# Patient Record
Sex: MALE | Race: BLACK OR AFRICAN AMERICAN | Employment: OTHER | URBAN - METROPOLITAN AREA
[De-identification: names, ages, dates, MRNs, and addresses within clinical notes are randomized per-mention and may not be internally consistent; named-entity substitution may affect disease eponyms.]

---

## 2017-03-20 ENCOUNTER — PATIENT OUTREACH (OUTPATIENT)
Dept: INTERNAL MEDICINE CLINIC | Age: 82
End: 2017-03-20

## 2017-03-20 NOTE — PROGRESS NOTES
Pt identified by [facility] high risk report. Pt last visit with Dr Julia Davis 9/27/16. Pt is due for HTN/low magnesium follow up appt. Pt was seen at [facility]. Per EMR due to: fall/swollen area to the left forehead.      NN outgoing call to pt. Not able to reach. Left message on voice mail for return call.

## 2017-03-24 ENCOUNTER — OFFICE VISIT (OUTPATIENT)
Dept: INTERNAL MEDICINE CLINIC | Age: 82
End: 2017-03-24

## 2017-03-24 ENCOUNTER — HOSPITAL ENCOUNTER (OUTPATIENT)
Dept: LAB | Age: 82
Discharge: HOME | End: 2017-03-24

## 2017-03-24 VITALS
HEIGHT: 63 IN | WEIGHT: 91.6 LBS | HEART RATE: 123 BPM | SYSTOLIC BLOOD PRESSURE: 105 MMHG | BODY MASS INDEX: 16.23 KG/M2 | TEMPERATURE: 98.7 F | OXYGEN SATURATION: 97 % | DIASTOLIC BLOOD PRESSURE: 74 MMHG

## 2017-03-24 DIAGNOSIS — R31.9 URINARY TRACT INFECTION WITH HEMATURIA, SITE UNSPECIFIED: ICD-10-CM

## 2017-03-24 DIAGNOSIS — N39.0 URINARY TRACT INFECTION WITH HEMATURIA, SITE UNSPECIFIED: ICD-10-CM

## 2017-03-24 DIAGNOSIS — R00.0 TACHYCARDIA: Primary | ICD-10-CM

## 2017-03-24 PROCEDURE — 99001 SPECIMEN HANDLING PT-LAB: CPT | Performed by: INTERNAL MEDICINE

## 2017-03-24 NOTE — PROGRESS NOTES
Patient presents today for F/U hypertension   Pt preferred language for healthcare discussion is english. Do you have an advanced directive? No  Is someone accompanying this pt? If so who? Yes his daughter   Is the patient using any DME equipment during OV? Yes  What equipment? A wheel chair  1. Have you been to the ER, urgent care clinic since your last visit? Hospitalized since your last visit?  Yes SPAJESSE 6 weeks ago, patient was diagnosed with a UTI

## 2017-03-24 NOTE — PROGRESS NOTES
HISTORY OF PRESENT ILLNESS  Rolando De Leon is a 80 y.o. male. HPI Comments: 79 yo male here for f/u of confusion related to UTI dx in ED last month. Pt presented to ED after fall and was treated for UTI. + microscopic hematuria. He was treated with 10 day course of abx with improvement of cognition per family. Recently with some return of confusion. Pt notes some odor to urine but no pain. Family notes urine is darker. Pt noted to have tachycardia today. Denies palpitations, pain. Family notes he often does not drink much water. Hypertension    Pertinent negatives include no chest pain, no palpitations, no blurred vision, no headaches, no dizziness, no shortness of breath, no nausea and no vomiting. ED Follow-up   Pertinent negatives include no chest pain, no abdominal pain, no headaches and no shortness of breath. Review of Systems   Constitutional: Negative for chills, fever and weight loss. HENT: Positive for hearing loss (chronic). Negative for congestion. Eyes: Negative for blurred vision and pain. Respiratory: Negative for cough and shortness of breath. Cardiovascular: Negative for chest pain, palpitations and leg swelling. Gastrointestinal: Negative for abdominal pain, nausea and vomiting. Genitourinary: Negative for dysuria, flank pain, frequency and urgency. Musculoskeletal: Falls: Feb, in bathroom, slipped. Skin: Negative for itching and rash. Neurological: Negative for dizziness, tingling and headaches. Psychiatric/Behavioral: Negative for depression. The patient is not nervous/anxious. Past Medical History:   Diagnosis Date    Arthritis     BPH (benign prostatic hyperplasia)     Gout     Hypercholesterolemia     Hypertension      Current Outpatient Prescriptions on File Prior to Visit   Medication Sig Dispense Refill    magnesium oxide (MAG-OX) 400 mg tablet Take 1 Tab by mouth daily. 60 Tab 2    FOLIC ACID/MV,FE,MIN (CENTRUM PO) Take  by mouth.       haloperidol (HALDOL) 2 mg/mL oral concentrate 0.5  ml bid  Indications: DELIRIUM 120 mL 5    prednisoLONE acetate (PRED FORTE) 1 % ophthalmic suspension Administer 1 Drop to left eye two (2) times a day. No current facility-administered medications on file prior to visit. NOT TAKING ANY MEDICATION AT THIS TIME    Physical Exam   Constitutional: He appears well-developed and well-nourished. No distress. /74 (BP 1 Location: Left arm, BP Patient Position: Sitting)  Pulse (!) 123  Temp 98.7 °F (37.1 °C) (Oral)   Ht 5' 3\" (1.6 m)  Wt 91 lb 9.6 oz (41.5 kg)  SpO2 97%  BMI 16.23 kg/m2     Eyes: EOM are normal. Right eye exhibits no discharge. Left eye exhibits no discharge. No scleral icterus. Neck: Neck supple. Cardiovascular: Regular rhythm and normal heart sounds. Exam reveals no gallop and no friction rub. No murmur heard. Pulmonary/Chest: Effort normal and breath sounds normal. No respiratory distress. He has no wheezes. He has no rales. Abdominal: Soft. He exhibits no distension. There is no tenderness. There is no rebound and no guarding. Musculoskeletal: He exhibits no edema or tenderness. Lymphadenopathy:     He has no cervical adenopathy. Neurological: He is alert. He exhibits normal muscle tone. Skin: Skin is warm and dry. Psychiatric: He has a normal mood and affect.      Lab Results   Component Value Date/Time    WBC 5.6 07/27/2016 05:26 PM    Hemoglobin (POC) 13.3 11/07/2015 10:55 AM    HGB 11.6 07/27/2016 05:26 PM    Hematocrit (POC) 39 11/07/2015 10:55 AM    HCT 36.3 07/27/2016 05:26 PM    PLATELET 040 23/05/7979 05:26 PM    MCV 87.7 07/27/2016 05:26 PM     Lab Results   Component Value Date/Time    Sodium 140 09/27/2016 04:46 AM    Potassium 5.2 09/27/2016 04:46 AM    Chloride 98 09/27/2016 04:46 AM    CO2 28 09/27/2016 04:46 AM    Anion gap 11 07/27/2016 05:26 PM    Glucose 84 09/27/2016 04:46 AM    BUN 21 09/27/2016 04:46 AM    Creatinine 1.24 09/27/2016 04:46 AM    BUN/Creatinine ratio 17 09/27/2016 04:46 AM    GFR est AA 57 09/27/2016 04:46 AM    GFR est non-AA 49 09/27/2016 04:46 AM    Calcium 9.2 09/27/2016 04:46 AM    Bilirubin, total 0.3 09/27/2016 04:46 AM    AST (SGOT) 14 09/27/2016 04:46 AM    Alk. phosphatase 83 09/27/2016 04:46 AM    Protein, total 6.5 09/27/2016 04:46 AM    Albumin 3.5 09/27/2016 04:46 AM    Globulin 2.7 11/09/2015 04:16 AM    A-G Ratio 1.2 09/27/2016 04:46 AM    ALT (SGPT) 9 09/27/2016 04:46 AM     ASSESSMENT and PLAN    ICD-10-CM ICD-9-CM    1. Tachycardia R00.0 785.0 AMB POC EKG ROUTINE W/ 12 LEADS, INTER & REP   2. Urinary tract infection with hematuria, site unspecified N39.0 599.0 CBC W/O DIFF    M54.2  METABOLIC PANEL, COMPREHENSIVE      URINALYSIS W/ RFLX MICROSCOPIC   EKG today with sinus tach. Will check CMP, CBC. UA given recent UTI/confusion. Discussed importance of adequate hydration.

## 2017-03-24 NOTE — MR AVS SNAPSHOT
Visit Information Date & Time Provider Department Dept. Phone Encounter #  
 3/24/2017 11:45 AM [address] [phone] [id number] Upcoming Health Maintenance Date Due MICROALBUMIN Q1 7/24/1932 EYE EXAM RETINAL OR DILATED Q1 7/24/1932 DTaP/Tdap/Td series (1 - Tdap) 7/24/1943 ZOSTER VACCINE AGE 60> 7/24/1982 HEMOGLOBIN A1C Q6M 5/7/2016 LIPID PANEL Q1 11/8/2016 Pneumococcal 65+ High/Highest Risk (2 of 2 - PCV13) 11/11/2016 MEDICARE YEARLY EXAM 12/31/2016 FOOT EXAM Q1 2/23/2017 GLAUCOMA SCREENING Q2Y 12/22/2017 Allergies as of 3/24/2017  Review Complete On: 3/24/2017 By: Kailey Nielson MD  
 No Known Allergies Current Immunizations  Reviewed on 9/21/2012 Name Date Influenza High Dose Vaccine PF 9/27/2016 Influenza Vaccine (Quad) PF 11/11/2015  4:09 PM  
 Influenza Vaccine Split 9/21/2012, 9/30/2011 Pneumococcal Polysaccharide (PPSV-23) 11/11/2015  4:11 PM  
  
 Not reviewed this visit You Were Diagnosed With   
  
 Codes Comments Tachycardia    -  Primary ICD-10-CM: R00.0 ICD-9-CM: 785.0 Urinary tract infection with hematuria, site unspecified     ICD-10-CM: N39.0, R31.9 ICD-9-CM: 599.0 Vitals BP Pulse Temp Height(growth percentile) Weight(growth percentile) SpO2  
 105/74 (BP 1 Location: Left arm, BP Patient Position: Sitting) (!) 123 98.7 °F (37.1 °C) (Oral) 5' 3\" (1.6 m) 91 lb 9.6 oz (41.5 kg) 97% BMI Smoking Status 16.23 kg/m2 Former Smoker BMI and BSA Data Body Mass Index Body Surface Area  
 16.23 kg/m 2 1.36 m 2 Preferred Pharmacy Pharmacy Name Phone [facility] [address] [phone] Your Updated Medication List  
  
   
This list is accurate as of: 3/24/17 12:39 PM.  Always use your most recent med list.  
  
  
  
  
 CENTRUM PO Take  by mouth. haloperidol 2 mg/mL oral concentrate Commonly known as:  HALDOL  
0.5  ml bid  Indications: DELIRIUM  
  
 magnesium oxide 400 mg tablet Commonly known as:  MAG-OX Take 1 Tab by mouth daily. prednisoLONE acetate 1 % ophthalmic suspension Commonly known as:  PRED FORTE Administer 1 Drop to left eye two (2) times a day. We Performed the Following AMB POC EKG ROUTINE W/ 12 LEADS, INTER & REP [22482 CPT(R)] To-Do List   
 03/24/2017 Lab:  CBC W/O DIFF   
  
 03/24/2017 Microbiology:  CULTURE, URINE   
  
 03/24/2017 Lab:  METABOLIC PANEL, COMPREHENSIVE   
  
 03/24/2017 Lab:  URINALYSIS W/ RFLX MICROSCOPIC Patient Instructions Dehydration: Care Instructions Your Care Instructions Dehydration happens when your body loses too much fluid. This might happen when you do not drink enough water or you lose large amounts of fluids from your body because of diarrhea, vomiting, or sweating. Severe dehydration can be life-threatening. Water and minerals called electrolytes help put your body fluids back in balance. Learn the early signs of fluid loss, and drink more fluids to prevent dehydration. Follow-up care is a key part of your treatment and safety. Be sure to make and go to all appointments, and call your doctor if you are having problems. It's also a good idea to know your test results and keep a list of the medicines you take. How can you care for yourself at home? · To prevent dehydration, drink plenty of fluids, enough so that your urine is light yellow or clear like water. Choose water and other caffeine-free clear liquids until you feel better. If you have kidney, heart, or liver disease and have to limit fluids, talk with your doctor before you increase the amount of fluids you drink. · If you do not feel like eating or drinking, try taking small sips of water, sports drinks, or other rehydration drinks.  
· Get plenty of rest. 
 To prevent dehydration · Add more fluids to your diet and daily routine, unless your doctor has told you not to. · During hot weather, drink more fluids. Drink even more fluids if you exercise a lot. Stay away from drinks with alcohol or caffeine. · Watch for the symptoms of dehydration. These include: ¨ A dry, sticky mouth. ¨ Dark yellow urine, and not much of it. ¨ Dry and sunken eyes. ¨ Feeling very tired. · Learn what problems can lead to dehydration. These include: ¨ Diarrhea, fever, and vomiting. ¨ Any illness with a fever, such as pneumonia or the flu. ¨ Activities that cause heavy sweating, such as endurance races and heavy outdoor work in hot or humid weather. ¨ Alcohol or drug abuse or withdrawal. 
¨ Certain medicines, such as cold and allergy pills (antihistamines), diet pills (diuretics), and laxatives. ¨ Certain diseases, such as diabetes, cancer, and heart or kidney disease. When should you call for help? Call 911 anytime you think you may need emergency care. For example, call if: 
· You passed out (lost consciousness). Call your doctor now or seek immediate medical care if: 
· You are confused and cannot think clearly. · You are dizzy or lightheaded, or you feel like you may faint. · You have signs of needing more fluids. You have sunken eyes and a dry mouth, and you pass only a little dark urine. · You cannot keep fluids down. Watch closely for changes in your health, and be sure to contact your doctor if: 
· You are not making tears. · Your skin is very dry and sags slowly back into place after you pinch it. · Your mouth and eyes are very dry. Where can you learn more? Go to http://laureen-bart.info/. Enter N820 in the search box to learn more about \"Dehydration: Care Instructions. \" Current as of: May 27, 2016 Content Version: 11.1 © 6114-9521 [employer].  Care instructions adapted under license by [address] (which disclaims liability or warranty for this information). If you have questions about a medical condition or this instruction, always ask your healthcare professional. [address] any warranty or liability for your use of this information. Introducing [facility]! Mary Milan introduces Predict[employer] patient portal. Now you can access parts of your medical record, email your doctor's office, and request medication refills online. 1. In your internet browser, go to [email or web address] 2. Click on the First Time User? Click Here link in the Sign In box. You will see the New Member Sign Up page. 3. Enter your Predictify Access Code exactly as it appears below. You will not need to use this code after youve completed the sign-up process. If you do not sign up before the expiration date, you must request a new code. · Predictify Access Code: [email or web address] Expires: 6/22/2017 12:39 PM 
 
4. Enter the last four digits of your Social Security Number (xxxx) and Date of Birth (mm/dd/yyyy) as indicated and click Submit. You will be taken to the next sign-up page. 5. Create a Predictify ID. This will be your Predictify login ID and cannot be changed, so think of one that is secure and easy to remember. 6. Create a Predictify password. You can change your password at any time. 7. Enter your Password Reset Question and Answer. This can be used at a later time if you forget your password. 8. Enter your e-mail address. You will receive e-mail notification when new information is available in [address] 9. Click Sign Up. You can now view and download portions of your medical record. 10. Click the Download Summary menu link to download a portable copy of your medical information. If you have questions, please visit the Frequently Asked Questions section of the Predictify website.  Remember, Predictify is NOT to be used for urgent needs. For medical emergencies, dial 911. Now available from your iPhone and Android! Please provide this summary of care documentation to your next provider. Your primary care clinician is listed as Ander Ferrell. If you have any questions after today's visit, please call [phone].

## 2017-03-24 NOTE — PATIENT INSTRUCTIONS
Dehydration: Care Instructions  Your Care Instructions  Dehydration happens when your body loses too much fluid. This might happen when you do not drink enough water or you lose large amounts of fluids from your body because of diarrhea, vomiting, or sweating. Severe dehydration can be life-threatening. Water and minerals called electrolytes help put your body fluids back in balance. Learn the early signs of fluid loss, and drink more fluids to prevent dehydration. Follow-up care is a key part of your treatment and safety. Be sure to make and go to all appointments, and call your doctor if you are having problems. It's also a good idea to know your test results and keep a list of the medicines you take. How can you care for yourself at home? · To prevent dehydration, drink plenty of fluids, enough so that your urine is light yellow or clear like water. Choose water and other caffeine-free clear liquids until you feel better. If you have kidney, heart, or liver disease and have to limit fluids, talk with your doctor before you increase the amount of fluids you drink. · If you do not feel like eating or drinking, try taking small sips of water, sports drinks, or other rehydration drinks. · Get plenty of rest.  To prevent dehydration  · Add more fluids to your diet and daily routine, unless your doctor has told you not to. · During hot weather, drink more fluids. Drink even more fluids if you exercise a lot. Stay away from drinks with alcohol or caffeine. · Watch for the symptoms of dehydration. These include:  ¨ A dry, sticky mouth. ¨ Dark yellow urine, and not much of it. ¨ Dry and sunken eyes. ¨ Feeling very tired. · Learn what problems can lead to dehydration. These include:  ¨ Diarrhea, fever, and vomiting. ¨ Any illness with a fever, such as pneumonia or the flu. ¨ Activities that cause heavy sweating, such as endurance races and heavy outdoor work in hot or humid weather.   ¨ Alcohol or drug abuse or withdrawal.  ¨ Certain medicines, such as cold and allergy pills (antihistamines), diet pills (diuretics), and laxatives. ¨ Certain diseases, such as diabetes, cancer, and heart or kidney disease. When should you call for help? Call 911 anytime you think you may need emergency care. For example, call if:  · You passed out (lost consciousness). Call your doctor now or seek immediate medical care if:  · You are confused and cannot think clearly. · You are dizzy or lightheaded, or you feel like you may faint. · You have signs of needing more fluids. You have sunken eyes and a dry mouth, and you pass only a little dark urine. · You cannot keep fluids down. Watch closely for changes in your health, and be sure to contact your doctor if:  · You are not making tears. · Your skin is very dry and sags slowly back into place after you pinch it. · Your mouth and eyes are very dry. Where can you learn more? Go to http://laureen-bart.info/. Enter X524 in the search box to learn more about \"Dehydration: Care Instructions. \"  Current as of: May 27, 2016  Content Version: 11.1  © 6321-9859 [employer]. Care instructions adapted under license by [employer] (which disclaims liability or warranty for this information). If you have questions about a medical condition or this instruction, always ask your healthcare professional. [place] [address] any warranty or liability for your use of this information.

## 2017-03-25 LAB
ALBUMIN SERPL-MCNC: 4 G/DL (ref 3.2–4.6)
ALBUMIN/GLOB SERPL: 1.5 {RATIO} (ref 1.2–2.2)
ALP SERPL-CCNC: 72 IU/L (ref 39–117)
ALT SERPL-CCNC: 15 IU/L (ref 0–44)
AST SERPL-CCNC: 19 IU/L (ref 0–40)
BILIRUB SERPL-MCNC: 0.3 MG/DL (ref 0–1.2)
BUN SERPL-MCNC: 27 MG/DL (ref 10–36)
BUN/CREAT SERPL: 17 (ref 10–22)
CALCIUM SERPL-MCNC: 9.6 MG/DL (ref 8.6–10.2)
CHLORIDE SERPL-SCNC: 99 MMOL/L (ref 96–106)
CO2 SERPL-SCNC: 25 MMOL/L (ref 18–29)
CREAT SERPL-MCNC: 1.55 MG/DL (ref 0.76–1.27)
ERYTHROCYTE [DISTWIDTH] IN BLOOD BY AUTOMATED COUNT: 15 % (ref 12.3–15.4)
GLOBULIN SER CALC-MCNC: 2.6 G/DL (ref 1.5–4.5)
GLUCOSE SERPL-MCNC: 92 MG/DL (ref 65–99)
HCT VFR BLD AUTO: 38.1 % (ref 37.5–51)
HGB BLD-MCNC: 12.5 G/DL (ref 12.6–17.7)
MCH RBC QN AUTO: 27.4 PG (ref 26.6–33)
MCHC RBC AUTO-ENTMCNC: 32.8 G/DL (ref 31.5–35.7)
MCV RBC AUTO: 83 FL (ref 79–97)
PLATELET # BLD AUTO: 269 X10E3/UL (ref 150–379)
POTASSIUM SERPL-SCNC: 4.8 MMOL/L (ref 3.5–5.2)
PROT SERPL-MCNC: 6.6 G/DL (ref 6–8.5)
RBC # BLD AUTO: 4.57 X10E6/UL (ref 4.14–5.8)
SODIUM SERPL-SCNC: 141 MMOL/L (ref 134–144)
WBC # BLD AUTO: 4.8 X10E3/UL (ref 3.4–10.8)

## 2017-03-27 ENCOUNTER — TELEPHONE (OUTPATIENT)
Dept: INTERNAL MEDICINE CLINIC | Age: 82
End: 2017-03-27

## 2017-03-27 NOTE — TELEPHONE ENCOUNTER
Please let him know that the urine culture result is not back. His blood work shows he is a little dry and needs to hydrate better as we discussed at his visit.

## 2017-03-27 NOTE — TELEPHONE ENCOUNTER
Patient daughter called said she was still waiting on a return call. She is going to take her dad to the er because she thinks her dad may have had a stroke.  She can be reached at [phone]

## 2017-03-28 ENCOUNTER — DOCUMENTATION ONLY (OUTPATIENT)
Dept: INTERNAL MEDICINE CLINIC | Age: 82
End: 2017-03-28

## 2017-03-28 NOTE — TELEPHONE ENCOUNTER
CAlled pt daughter 2 pt identifiers confirmed. Pt daughter stated that [facility] is upset that she did not receive a call over the weekend in regards to pt labs stated understanding and informed her that pt labs were just resulted on 3/27/17 that we werent ignoring her or the pt but that the results just came through she stated understanding however that Dr Gisel Yanez told her she would call over the weekend and if tshe wasn't going to call than she shouldn't have said it stated understanding and that will let Dr Gisel Yanez know about her complaints pt daughter stated understanding. Asked how pt is doing she stated that he had a bleed in his brain and was taken to [facility] and is on the trauma unit stated understanding no other questions or concerns noted at this time.     DAVIDA

## 2017-03-28 NOTE — PROGRESS NOTES
Attempted to reach patient's daughter to discuss her concerns as I had been waiting for urine results to determine if antibiotic was necessary over the weekend and had not realized he was unable to give sample. Left message and asked that she call back if she had further concerns.

## 2017-03-29 ENCOUNTER — PATIENT OUTREACH (OUTPATIENT)
Dept: INTERNAL MEDICINE CLINIC | Age: 82
End: 2017-03-29

## 2017-03-29 NOTE — PROGRESS NOTES
Pt is currently admitted to [facility] as of 3/28/17 per EMR due to subdural hematoma. Will follow upon discharge.

## 2017-04-11 ENCOUNTER — PATIENT OUTREACH (OUTPATIENT)
Dept: INTERNAL MEDICINE CLINIC | Age: 82
End: 2017-04-11

## 2017-04-11 NOTE — PROGRESS NOTES
Transitional Care Nurse Navigator Note:  Hospital Follow Up for Admission from [facility] 3/28/17 - 4/8/17 transferred to [facility]    Per EMR due to:   Discharge Diagnosis:     Patient Active Problem List   Diagnosis   Intertrochanteric fracture of left hip (HCC)   Hip fracture (HCC)   S/P ORIF (open reduction internal fixation) fracture   Acute pain of left hip   [place] [facility]  Aftercare following surgery   SDH (subdural hematoma) (HCC)   Subdural hematoma (HCC)       Will follow upon discharge.

## 2017-05-01 ENCOUNTER — PATIENT OUTREACH (OUTPATIENT)
Dept: INTERNAL MEDICINE CLINIC | Age: 82
End: 2017-05-01

## 2017-05-01 NOTE — PROGRESS NOTES
Transitional Care Nurse Navigator Note:  Hospital Follow Up for Admission from  [facility] 3/28/17 - 4/8/17 transferred to [facility] 4/8/17 - 4/30/17      Per EMR due to:   Discharge Diagnosis:       Patient Active Problem List   Diagnosis   Intertrochanteric fracture of left hip (HCC)   Hip fracture (HCC)   S/P ORIF (open reduction internal fixation) fracture   Acute pain of left hip   [place] [facility]  [place]care following surgery   SDH (subdural hematoma) (HCC)   Subdural hematoma (HCC)       NN outgoing call to daughter Jeane Gtz. Pt ID verified. She states pt came home from rehab last night. She paid for extra days. She states pt is in good spirits and eating good. Pt has hospital bed, walker, and bedside commode. [address] [place] [address] has already called them. J Luis Diamond is requesting a personal aide for pt to help with ADL's. Sometimes he wants to the kitchen, with assist.    New medication:  midodrine 10mg she thinks tid. Lopressor 25 mg bid  flomax 0.4mg daily    She will schedule pt to see neurosurgeon Lucrecia Ta. She states he needs a MRI. Also she will call us back to schedule appt with Dr Viji Hoover. Pt education done for changes in pt baseline to seek medical attention. She verbalized understanding and thanked us for the call.

## 2017-05-11 ENCOUNTER — TELEPHONE (OUTPATIENT)
Dept: INTERNAL MEDICINE CLINIC | Age: 82
End: 2017-05-11

## 2017-05-11 NOTE — TELEPHONE ENCOUNTER
I spoke with risk management. Can we request written request from [facility]? Once received I am able to speak with them. Thank you.

## 2017-05-11 NOTE — TELEPHONE ENCOUNTER
[facility] Investigator called to speak with Dr Mariaelena Arciniega concerning patient. Requests call back as soon as possible.

## 2017-05-12 NOTE — TELEPHONE ENCOUNTER
Attempted to contact Ed with [facility], no answer. Lvm for Ed to return call to office at [phone]. Will continue to try to contact Ed.

## 2017-05-15 ENCOUNTER — OFFICE VISIT (OUTPATIENT)
Dept: INTERNAL MEDICINE CLINIC | Age: 82
End: 2017-05-15

## 2017-05-15 VITALS
RESPIRATION RATE: 18 BRPM | WEIGHT: 104.2 LBS | SYSTOLIC BLOOD PRESSURE: 117 MMHG | OXYGEN SATURATION: 96 % | HEART RATE: 81 BPM | TEMPERATURE: 97.5 F | DIASTOLIC BLOOD PRESSURE: 77 MMHG | HEIGHT: 63 IN | BODY MASS INDEX: 18.46 KG/M2

## 2017-05-15 DIAGNOSIS — S06.5XAA SUBDURAL HEMATOMA: Primary | Chronic | ICD-10-CM

## 2017-05-15 RX ORDER — MIDODRINE HYDROCHLORIDE 10 MG/1
5 TABLET ORAL
COMMUNITY
Start: 2017-04-07 | End: 2017-06-05 | Stop reason: SDUPTHER

## 2017-05-15 RX ORDER — TAMSULOSIN HYDROCHLORIDE 0.4 MG/1
0.4 CAPSULE ORAL
COMMUNITY
End: 2017-12-29

## 2017-05-15 RX ORDER — METOPROLOL TARTRATE 25 MG/1
25 TABLET, FILM COATED ORAL
COMMUNITY
End: 2017-05-18 | Stop reason: SDUPTHER

## 2017-05-15 NOTE — PROGRESS NOTES
FAMILY MEDICINE CLINIC NOTE    S: Mala alfonso patient's daughter    Patient presents for follow up transitional care after recently being discharged from rehab therapy at [facility] 4/8/17 - 4/30/17 after a subdural hematoma with neurosurgical intervention at  [facility]3/28/17-4/8/17. He has been getting home PT now, working on ambulation. Adult Protective Services would like to determine if the patient is safe to be unattended at home. He is currently under the care of his daughter. Not independent with cleaning, meals, medication administration, finances, or dressing. Toilet activities are independant,  ambulates mostly with walker around the house. There is a family friend that brings him lunch and stays with him for an hour. Short term memory loss has increased since his surgery    No behavioral change     No falls at home prior to the episode that led to his subdural hematoma. No change in gait    Patient is home alone for about 6 hours every day     Current Outpatient Prescriptions on File Prior to Visit   Medication Sig Dispense Refill    FOLIC ACID/MV,FE,MIN (CENTRUM PO) Take  by mouth.  prednisoLONE acetate (PRED FORTE) 1 % ophthalmic suspension Administer 1 Drop to left eye two (2) times a day.  magnesium oxide (MAG-OX) 400 mg tablet Take 1 Tab by mouth daily. 60 Tab 2    haloperidol (HALDOL) 2 mg/mL oral concentrate 0.5  ml bid  Indications: DELIRIUM 120 mL 5     No current facility-administered medications on file prior to visit. Past Medical History:   Diagnosis Date    Arthritis     BPH (benign prostatic hyperplasia)     Gout     Hypercholesterolemia     Hypertension        Social History     Social History    Marital status: SINGLE     Spouse name: N/A    Number of children: N/A    Years of education: N/A     Occupational History    Not on file.      Social History Main Topics    Smoking status: Former Smoker    Smokeless tobacco: Never Used    Alcohol use No    Drug use: No    Sexual activity: No     Other Topics Concern    Not on file     Social History Narrative       Family History   Problem Relation Age of Onset    Hypertension Brother     No Known Problems Mother     No Known Problems Father        Review of Systems - Negative except as noted in HPI     O:  Visit Vitals    /77    Pulse 81    Temp 97.5 °F (36.4 °C) (Oral)    Resp 18    Ht 5' 3\" (1.6 m)    Wt 104 lb 3.2 oz (47.3 kg)    SpO2 96%    BMI 18.46 kg/m2     NAD, comfortable, mildly cachectic  NCAT  Ambulating in wheelchair  Primarily non-verbal  Arcus senilis bilaterally  RRR, no murmurs  CTABL, no wheezing/ronchi/rales  abd soft ND NT normoactive BS  No flank or suprapubic TTP  4/5 motor strength bilateral upper and lower extremities  No bruises, no musculoskeletal TTP       80 y.o. male      ICD-10-CM ICD-9-CM    1. Subdural hematoma (HCC) I62.00 432.1 Hematoma secondary to a fall at home, no prior hospitalizations for falls at home. Not fully independent with activities of daily living but no current concern for danger of being alone at home given that there is no history of recurrent falls and no current signs of physical abuse. Patient to follow up with home PT  Follow up with neuro surgery.   Follow up with PCP

## 2017-05-15 NOTE — PROGRESS NOTES
Chief Complaint   Patient presents with   [facility] Follow Up     pt discharged from [facility]       Pt preferred language for health care discussion is english. Is someone accompanying this pt? daughter    Is the patient using any DME equipment during OV? wheelchair    Depression Screening completed. Yes    Learning Assessment completed. Yes    Abuse Screening completed. Yes    Health Maintenance reviewed and discussed per provider. Follow up with PCP    Advance Directive:  1. Do you have an advance directive in place? Patient Reply:no    2. If not, would you like material regarding how to put one in place? Patient Reply: No    Coordination of Care:  1. Have you been to the ER, urgent care clinic since your last visit? Hospitalized since your last visit? [facility] s/p [facility] rehab    2. Have you seen or consulted any other health care providers outside of the [address] [place] [address] since your last visit? Include any pap smears or colon screening.  no

## 2017-05-15 NOTE — TELEPHONE ENCOUNTER
Attempted to get in touch with Justina Champion with [address] APS. LVM stating that Dr. Aren Coppola requires a written request, prior to being able to discuss a patient (case). Gave the fax number [phone], as well as physical address [address] Advised to call [phone] with any further questions or concerns.     Be advised

## 2017-05-15 NOTE — MR AVS SNAPSHOT
Visit Information Date & Time Provider Department Dept. Phone Encounter #  
 5/15/2017  5:00 PM [address] [phone] [id number] Follow-up Instructions Return if symptoms worsen or fail to improve. Upcoming Health Maintenance Date Due MICROALBUMIN Q1 7/24/1932 EYE EXAM RETINAL OR DILATED Q1 7/24/1932 DTaP/Tdap/Td series (1 - Tdap) 7/24/1943 ZOSTER VACCINE AGE 60> 7/24/1982 HEMOGLOBIN A1C Q6M 5/7/2016 LIPID PANEL Q1 11/8/2016 Pneumococcal 65+ Low/Medium Risk (2 of 2 - PCV13) 11/11/2016 MEDICARE YEARLY EXAM 12/31/2016 FOOT EXAM Q1 2/23/2017 INFLUENZA AGE 9 TO ADULT 8/1/2017 GLAUCOMA SCREENING Q2Y 12/22/2017 Allergies as of 5/15/2017  Review Complete On: 5/15/2017 By: Selam Kc MD  
 No Known Allergies Current Immunizations  Reviewed on 9/21/2012 Name Date Influenza High Dose Vaccine PF 9/27/2016 Influenza Vaccine (Quad) PF 11/11/2015  4:09 PM  
 Influenza Vaccine Split 9/21/2012, 9/30/2011 Pneumococcal Polysaccharide (PPSV-23) 11/11/2015  4:11 PM  
  
 Not reviewed this visit You Were Diagnosed With   
  
 Codes Comments Subdural hematoma (HCC)    -  Primary ICD-10-CM: I62.00 ICD-9-CM: 432.1 Vitals BP Pulse Temp Resp Height(growth percentile) Weight(growth percentile) 117/77 81 97.5 °F (36.4 °C) (Oral) 18 5' 3\" (1.6 m) 104 lb 3.2 oz (47.3 kg) SpO2 BMI Smoking Status 96% 18.46 kg/m2 Former Smoker BMI and BSA Data Body Mass Index Body Surface Area  
 18.46 kg/m 2 1.45 m 2 Preferred Pharmacy Pharmacy Name Phone [facility], [address]  [address] [facility] [address] [place] [address] [phone] Your Updated Medication List  
  
   
This list is accurate as of: 5/15/17  5:21 PM.  Always use your most recent med list.  
  
  
  
  
 CENTRUM PO Take  by mouth.  
  
 haloperidol 2 mg/mL oral concentrate Commonly known as:  HALDOL  
0.5  ml bid  Indications: DELIRIUM  
  
 magnesium oxide 400 mg tablet Commonly known as:  MAG-OX Take 1 Tab by mouth daily. metoprolol tartrate 25 mg tablet Commonly known as:  LOPRESSOR  
25 mg.  
  
 midodrine 10 mg tablet Commonly known as:  PROAMITINE  
5 mg.  
  
 prednisoLONE acetate 1 % ophthalmic suspension Commonly known as:  PRED FORTE Administer 1 Drop to left eye two (2) times a day. tamsulosin 0.4 mg capsule Commonly known as:  FLOMAX  
0.4 mg. Follow-up Instructions Return if symptoms worsen or fail to improve. Introducing [facility] & HEALTH SERVICES! Rosalva Boyer introduces [employer] patient portal. Now you can access parts of your medical record, email your doctor's office, and request medication refills online. 1. In your internet browser, go to [email or web address] 2. Click on the First Time User? Click Here link in the Sign In box. You will see the New Member Sign Up page. 3. Enter your [employer] Access Code exactly as it appears below. You will not need to use this code after youve completed the sign-up process. If you do not sign up before the expiration date, you must request a new code. · [employer] Access Code: [email or web address] Expires: 6/22/2017 12:39 PM 
 
4. Enter the last four digits of your Social Security Number (xxxx) and Date of Birth (mm/dd/yyyy) as indicated and click Submit. You will be taken to the next sign-up page. 5. Create a [employer] ID. This will be your [employer] login ID and cannot be changed, so think of one that is secure and easy to remember. 6. Create a [employer] password. You can change your password at any time. 7. Enter your Password Reset Question and Answer. This can be used at a later time if you forget your password. 8. Enter your e-mail address. You will receive e-mail notification when new information is available in [address] 9. Click Sign Up. You can now view and download portions of your medical record. 10. Click the Download Summary menu link to download a portable copy of your medical information. If you have questions, please visit the Frequently Asked Questions section of the [employer] website. Remember, [employer] is NOT to be used for urgent needs. For medical emergencies, dial 911. Now available from your iPhone and Android! Please provide this summary of care documentation to your next provider. Your primary care clinician is listed as Ander Ferrell. If you have any questions after today's visit, please call [phone].

## 2017-05-18 ENCOUNTER — HOME HEALTH ADMISSION (OUTPATIENT)
Dept: HOME HEALTH SERVICES | Facility: HOME HEALTH | Age: 82
End: 2017-05-18

## 2017-05-18 DIAGNOSIS — S06.5XAA SDH (SUBDURAL HEMATOMA): Primary | ICD-10-CM

## 2017-05-18 RX ORDER — METOPROLOL TARTRATE 25 MG/1
25 TABLET, FILM COATED ORAL 2 TIMES DAILY
Qty: 180 TAB | Refills: 3 | Status: SHIPPED | OUTPATIENT
Start: 2017-05-18 | End: 2017-12-29

## 2017-05-18 NOTE — TELEPHONE ENCOUNTER
Incoming call from patients daughter stating that she would like patient to get homehealth , patients daughter reports  patient had brain surgery on 3/31/17 and was discharged from [facility] on 4/8/17. She reports patient was discharge from  [facility]  rehab on 4/30/17. Patient daughter also states patient needs refills on metoprolol tartrate (Lopressor).

## 2017-05-19 ENCOUNTER — HOME CARE VISIT (OUTPATIENT)
Dept: HOME HEALTH SERVICES | Facility: HOME HEALTH | Age: 82
End: 2017-05-19

## 2017-05-22 ENCOUNTER — HOME CARE VISIT (OUTPATIENT)
Dept: HOME HEALTH SERVICES | Facility: HOME HEALTH | Age: 82
End: 2017-05-22

## 2017-05-22 ENCOUNTER — TELEPHONE (OUTPATIENT)
Dept: INTERNAL MEDICINE CLINIC | Age: 82
End: 2017-05-22

## 2017-06-05 NOTE — TELEPHONE ENCOUNTER
Patient's daughter called in, states she forgot to order this medication at the patient's last visit. States she needs a 30 day sent to the patient's local [facility] and a 90 day sent to the [facility]'s mail order pharmacy. States the patients dosage is 10mg daily at bedtime. Requested Prescriptions     Pending Prescriptions Disp Refills    midodrine (PROAMITINE) 10 mg tablet       Si.5 Tabs.

## 2017-06-06 RX ORDER — MIDODRINE HYDROCHLORIDE 10 MG/1
10 TABLET ORAL
Qty: 30 TAB | Refills: 0 | Status: SHIPPED | OUTPATIENT
Start: 2017-06-06 | End: 2017-07-06

## 2017-06-06 RX ORDER — MIDODRINE HYDROCHLORIDE 10 MG/1
10 TABLET ORAL
Qty: 90 TAB | Refills: 3 | Status: SHIPPED | OUTPATIENT
Start: 2017-06-06 | End: 2017-06-21 | Stop reason: SDUPTHER

## 2017-06-14 ENCOUNTER — TELEPHONE (OUTPATIENT)
Dept: INTERNAL MEDICINE CLINIC | Age: 82
End: 2017-06-14

## 2017-06-14 NOTE — TELEPHONE ENCOUNTER
[facility] John Kitchen with [facility] [place] called to let you know the patient is being DC'd today, he has completed all of his therapy.

## 2017-06-21 ENCOUNTER — OFFICE VISIT (OUTPATIENT)
Dept: INTERNAL MEDICINE CLINIC | Age: 82
End: 2017-06-21

## 2017-06-21 VITALS
HEIGHT: 63 IN | DIASTOLIC BLOOD PRESSURE: 67 MMHG | HEART RATE: 103 BPM | OXYGEN SATURATION: 98 % | WEIGHT: 107.4 LBS | RESPIRATION RATE: 14 BRPM | BODY MASS INDEX: 19.03 KG/M2 | TEMPERATURE: 96.8 F | SYSTOLIC BLOOD PRESSURE: 97 MMHG

## 2017-06-21 DIAGNOSIS — S06.5XAA SUBDURAL HEMATOMA: Primary | Chronic | ICD-10-CM

## 2017-06-21 DIAGNOSIS — E78.2 MIXED HYPERLIPIDEMIA: ICD-10-CM

## 2017-06-21 DIAGNOSIS — R53.81 DEBILITY: ICD-10-CM

## 2017-06-21 DIAGNOSIS — N18.30 CKD (CHRONIC KIDNEY DISEASE) STAGE 3, GFR 30-59 ML/MIN (HCC): ICD-10-CM

## 2017-06-21 DIAGNOSIS — I10 ESSENTIAL HYPERTENSION: ICD-10-CM

## 2017-06-21 DIAGNOSIS — Z23 ENCOUNTER FOR IMMUNIZATION: ICD-10-CM

## 2017-06-21 DIAGNOSIS — R73.01 IFG (IMPAIRED FASTING GLUCOSE): ICD-10-CM

## 2017-06-21 NOTE — MR AVS SNAPSHOT
Visit Information Date & Time Provider Department Dept. Phone Encounter #  
 6/21/2017 11:45 AM [address] [phone] [id number] Follow-up Instructions Return in about 3 months (around 9/21/2017), or if symptoms worsen or fail to improve. Upcoming Health Maintenance Date Due MICROALBUMIN Q1 7/24/1932 EYE EXAM RETINAL OR DILATED Q1 7/24/1932 DTaP/Tdap/Td series (1 - Tdap) 7/24/1943 ZOSTER VACCINE AGE 60> 7/24/1982 HEMOGLOBIN A1C Q6M 5/7/2016 LIPID PANEL Q1 11/8/2016 Pneumococcal 65+ Low/Medium Risk (2 of 2 - PCV13) 11/11/2016 MEDICARE YEARLY EXAM 12/31/2016 FOOT EXAM Q1 2/23/2017 INFLUENZA AGE 9 TO ADULT 8/1/2017 GLAUCOMA SCREENING Q2Y 12/22/2017 Allergies as of 6/21/2017  Review Complete On: 6/21/2017 By: Marelyn Merlin, LPN No Known Allergies Current Immunizations  Reviewed on 9/21/2012 Name Date Influenza High Dose Vaccine PF 9/27/2016 Influenza Vaccine (Quad) PF 11/11/2015  4:09 PM  
 Influenza Vaccine Split 9/21/2012, 9/30/2011 Pneumococcal Conjugate (PCV-13)  Incomplete Pneumococcal Polysaccharide (PPSV-23) 11/11/2015  4:11 PM  
  
 Not reviewed this visit You Were Diagnosed With   
  
 Codes Comments Subdural hematoma (HCC)    -  Primary ICD-10-CM: I62.00 ICD-9-CM: 432.1 Debility     ICD-10-CM: R53.81 ICD-9-CM: 799.3 Essential hypertension     ICD-10-CM: I10 
ICD-9-CM: 401.9 Mixed hyperlipidemia     ICD-10-CM: E78.2 ICD-9-CM: 272.2 CKD (chronic kidney disease) stage 3, GFR 30-59 ml/min     ICD-10-CM: N18.3 ICD-9-CM: 585.3 IFG (impaired fasting glucose)     ICD-10-CM: R73.01 
ICD-9-CM: 790.21 Encounter for immunization     ICD-10-CM: W45 ICD-9-CM: V03.89 Vitals BP Pulse Temp Resp Height(growth percentile) Weight(growth percentile) 97/67 (!) 103 96.8 °F (36 °C) (Oral) 14 5' 3\" (1.6 m) 107 lb 6.4 oz (48.7 kg) SpO2 BMI Smoking Status 98% 19.03 kg/m2 Former Smoker Vitals History BMI and BSA Data Body Mass Index Body Surface Area 19.03 kg/m 2 1.47 m 2 Preferred Pharmacy Pharmacy Name Phone [facility] [place]  Maida Gutiérrez [phone] Your Updated Medication List  
  
   
This list is accurate as of: 6/21/17 12:33 PM.  Always use your most recent med list.  
  
  
  
  
 CENTRUM PO Take  by mouth.  
  
 haloperidol 2 mg/mL oral concentrate Commonly known as:  HALDOL  
0.5  ml bid  Indications: DELIRIUM  
  
 magnesium oxide 400 mg tablet Commonly known as:  MAG-OX Take 1 Tab by mouth daily. metoprolol tartrate 25 mg tablet Commonly known as:  LOPRESSOR Take 1 Tab by mouth two (2) times a day. midodrine 10 mg tablet Commonly known as:  Waite Dienes Take 1 Tab by mouth nightly for 30 days. prednisoLONE acetate 1 % ophthalmic suspension Commonly known as:  PRED FORTE Administer 1 Drop to left eye two (2) times a day. tamsulosin 0.4 mg capsule Commonly known as:  FLOMAX  
0.4 mg. We Performed the Following PNEUMOCOCCAL CONJ VACCINE 13 VALENT IM [id number] CPT(R)] REFERRAL TO PODIATRY [REF90 Custom] Comments:  
 Please evaluate patient for thickened toenails, debility. Follow-up Instructions Return in about 3 months (around 9/21/2017), or if symptoms worsen or fail to improve. To-Do List   
 06/21/2017 Lab:  CBC W/O DIFF   
  
 06/21/2017 Lab:  HEMOGLOBIN A1C WITH EAG   
  
 06/21/2017 Lab:  LIPID PANEL   
  
 06/21/2017 Lab:  METABOLIC PANEL, COMPREHENSIVE Referral Information Referral ID Referred By Referred To  
  
 [id number] Zion LOPEZ Not Available Visits Status Start Date End Date 1 New Request 6/21/17 6/21/18  If your referral has a status of pending review or denied, additional information will be sent to support the outcome of this decision. Introducing [facility] & HEALTH SERVICES! Suraj Bergeron introduces [employer] patient portal. Now you can access parts of your medical record, email your doctor's office, and request medication refills online. 1. In your internet browser, go to [email or web address] 2. Click on the First Time User? Click Here link in the Sign In box. You will see the New Member Sign Up page. 3. Enter your [employer] Access Code exactly as it appears below. You will not need to use this code after youve completed the sign-up process. If you do not sign up before the expiration date, you must request a new code. · [employer] Access Code: [email or web address] Expires: 6/22/2017 12:39 PM 
 
4. Enter the last four digits of your Social Security Number (xxxx) and Date of Birth (mm/dd/yyyy) as indicated and click Submit. You will be taken to the next sign-up page. 5. Create a [employer] ID. This will be your [employer] login ID and cannot be changed, so think of one that is secure and easy to remember. 6. Create a [employer] password. You can change your password at any time. 7. Enter your Password Reset Question and Answer. This can be used at a later time if you forget your password. 8. Enter your e-mail address. You will receive e-mail notification when new information is available in [address] 9. Click Sign Up. You can now view and download portions of your medical record. 10. Click the Download Summary menu link to download a portable copy of your medical information. If you have questions, please visit the Frequently Asked Questions section of the [employer] website. Remember, [employer] is NOT to be used for urgent needs. For medical emergencies, dial 911. Now available from your iPhone and Android! Please provide this summary of care documentation to your next provider. Your primary care clinician is listed as [address] [place] [address] Ave.  If you have any questions after today's visit, please call [phone].

## 2017-06-21 NOTE — PROGRESS NOTES
HISTORY OF PRESENT ILLNESS  Kelly Yadav is a 80 y.o. male. HPI Comments: 81 yo male here for f/u from SDH, debility. Last seen by Dr. Marycarmen Dupree due APS request regarding pt being home alone at times. Daughter states she has not yet met with APS yet. Pt has not had further falls. Has life alert with fall detection. Had been using wc, used walker today. She states he occasionally walks at home without assistive device. Still has friend coming in during day for lunch. Daughter is now off for the summer. BP stable on current medication. Denies feeling lightheaded. H/o CKD, IFG. Due for repeat labs. Daughter requesting podiatry referral be reentered. Cough   Pertinent negatives include no chest pain, no headaches and no shortness of breath. Review of Systems   Constitutional: Negative for chills, fever and weight loss. HENT: Negative for congestion. Eyes: Negative for blurred vision and pain. Respiratory: Positive for cough. Negative for shortness of breath. Cardiovascular: Negative for chest pain, palpitations and leg swelling. Gastrointestinal: Negative for heartburn, nausea and vomiting. Musculoskeletal: Positive for joint pain (shoulder occasionally) and neck pain (stiff occasionally). Negative for myalgias. Neurological: Negative for dizziness, tingling and headaches. Psychiatric/Behavioral: Negative for depression. The patient is not nervous/anxious. Past Medical History:   Diagnosis Date    Arthritis     BPH (benign prostatic hyperplasia)     Gout     Hypercholesterolemia     Hypertension      Current Outpatient Prescriptions on File Prior to Visit   Medication Sig Dispense Refill    midodrine (PROAMITINE) 10 mg tablet Take 1 Tab by mouth nightly for 30 days. 30 Tab 0    metoprolol tartrate (LOPRESSOR) 25 mg tablet Take 1 Tab by mouth two (2) times a day.  180 Tab 3    tamsulosin (FLOMAX) 0.4 mg capsule 0.4 mg.      magnesium oxide (MAG-OX) 400 mg tablet Take 1 Tab by mouth daily. 60 Tab 2    FOLIC ACID/MV,FE,MIN (CENTRUM PO) Take  by mouth.  haloperidol (HALDOL) 2 mg/mL oral concentrate 0.5  ml bid  Indications: DELIRIUM 120 mL 5    prednisoLONE acetate (PRED FORTE) 1 % ophthalmic suspension Administer 1 Drop to left eye two (2) times a day. No current facility-administered medications on file prior to visit. Social History   Substance Use Topics    Smoking status: Former Smoker    Smokeless tobacco: Never Used    Alcohol use No     Physical Exam   Constitutional: He appears well-developed and well-nourished. No distress. BP 97/67  Pulse (!) 103  Temp 96.8 °F (36 °C) (Oral)   Resp 14  Ht 5' 3\" (1.6 m)  Wt 107 lb 6.4 oz (48.7 kg)  SpO2 98%  BMI 19.03 kg/m2     Eyes: EOM are normal. Right eye exhibits no discharge. Left eye exhibits no discharge. No scleral icterus. Neck: Neck supple. Cardiovascular: Normal rate, regular rhythm and normal heart sounds. Exam reveals no gallop and no friction rub. No murmur heard. Pulmonary/Chest: Effort normal and breath sounds normal. No respiratory distress. He has no wheezes. He has no rales. Musculoskeletal: He exhibits tenderness (R ac). He exhibits no edema. Lymphadenopathy:     He has no cervical adenopathy. Neurological: He is alert. Skin: Skin is warm and dry. Psychiatric: He has a normal mood and affect. Lab Results   Component Value Date/Time    Sodium 141 03/24/2017 12:48 PM    Potassium 4.8 03/24/2017 12:48 PM    Chloride 99 03/24/2017 12:48 PM    CO2 25 03/24/2017 12:48 PM    Anion gap 11 07/27/2016 05:26 PM    Glucose 92 03/24/2017 12:48 PM    BUN 27 03/24/2017 12:48 PM    Creatinine 1.55 03/24/2017 12:48 PM    BUN/Creatinine ratio 17 03/24/2017 12:48 PM    GFR est AA 44 03/24/2017 12:48 PM    GFR est non-AA 38 03/24/2017 12:48 PM    Calcium 9.6 03/24/2017 12:48 PM    Bilirubin, total 0.3 03/24/2017 12:48 PM    AST (SGOT) 19 03/24/2017 12:48 PM    Alk.  phosphatase 72 03/24/2017 12:48 PM    Protein, total 6.6 03/24/2017 12:48 PM    Albumin 4.0 03/24/2017 12:48 PM    Globulin 2.7 11/09/2015 04:16 AM    A-G Ratio 1.5 03/24/2017 12:48 PM    ALT (SGPT) 15 03/24/2017 12:48 PM     Lab Results   Component Value Date/Time    WBC 4.8 03/24/2017 12:48 PM    Hemoglobin (POC) 13.3 11/07/2015 10:55 AM    HGB 12.5 03/24/2017 12:48 PM    Hematocrit (POC) 39 11/07/2015 10:55 AM    HCT 38.1 03/24/2017 12:48 PM    PLATELET 122 01/36/3274 12:48 PM    MCV 83 03/24/2017 12:48 PM     Lab Results   Component Value Date/Time    Cholesterol, total 180 11/08/2015 05:40 AM    HDL Cholesterol 53 11/08/2015 05:40 AM    LDL, calculated 112.4 11/08/2015 05:40 AM    VLDL, calculated 14.6 11/08/2015 05:40 AM    Triglyceride 73 11/08/2015 05:40 AM    CHOL/HDL Ratio 3.4 11/08/2015 05:40 AM     Lab Results   Component Value Date/Time    Hemoglobin A1c 5.2 11/07/2015 10:27 AM    Hemoglobin A1c (POC) 5.3 07/07/2015 05:22 PM     ASSESSMENT and PLAN    ICD-10-CM ICD-9-CM    1. Subdural hematoma (HCC) I62.00 432.1    2. Debility R53.81 799.3 REFERRAL TO PODIATRY   3. Essential hypertension Q46 795.0 METABOLIC PANEL, COMPREHENSIVE      CBC W/O DIFF   4. Mixed hyperlipidemia E78.2 272.2 LIPID PANEL   5. CKD (chronic kidney disease) stage 3, GFR 30-59 ml/min N18.3 585.3    6. IFG (impaired fasting glucose) R73.01 790.21 HEMOGLOBIN A1C WITH EAG   7. Encounter for immunization Z23 V03.89 PNEUMOCOCCAL CONJ VACCINE 13 VALENT IM   Stable at this time. Daughter plans to look into [facility]. Will repeat labs today. Continue current medications. Can try OTC product prn for cough. [address] next f/u.

## 2017-06-21 NOTE — PROGRESS NOTES
Chief Complaint   Patient presents with    Annual Wellness Visit    Cough     pt has chest congestion       Pt preferred language for health care discussion is English. Is someone accompanying this pt? daughter    Is the patient using any DME equipment during OV? Rollator    Depression Screening:  PHQ over the last two weeks 6/21/2017 5/15/2017 9/27/2016 8/16/2016 12/31/2015   Little interest or pleasure in doing things Not at all Not at all Not at all Not at all Not at all   Feeling down, depressed or hopeless Not at all Not at all Not at all Not at all Not at all   Total Score PHQ 2 0 0 0 0 0       Learning Assessment:  No flowsheet data found. Abuse Screening:  Abuse Screening Questionnaire 6/21/2017   Do you ever feel afraid of your partner? N   Are you in a relationship with someone who physically or mentally threatens you? N   Is it safe for you to go home? Y       Fall Risk  Fall Risk Assessment, last 12 mths 6/21/2017 5/15/2017 3/24/2017 9/27/2016 8/16/2016 7/27/2016 5/16/2016   Able to walk? Yes Yes Yes Yes Yes Yes Yes   Fall in past 12 months? Yes Yes Yes No No Yes No   Fall with injury? Yes Yes Yes - - Yes -   Number of falls in past 12 months 1 2 3 - - 1 -   Fall Risk Score 2 3 4 - - 2 -         Health Maintenance reviewed and discussed per provider. Yes    Pt is due for FIT test, Colonoscopy,  Bone Density,  Hep C screen, Microalbumin, A1C, Lipid, Foot exam, Diabetic eye exam, Zostavax, Pneumo-13 or Peumo-23, Flu, Tdap. Please order/place referral if appropriate. Advance Directive:  1. Do you have an advance directive in place? Patient Reply:Yes-DNR    2. If not, would you like material regarding how to put one in place? Patient Reply: no    Coordination of Care:  1. Have you been to the ER, urgent care clinic since your last visit? Hospitalized since your last visit? no    2.  Have you seen or consulted any other health care providers outside of the [address] [place] [address] since your last visit? Include any pap smears or colon screening.  no

## 2017-06-22 LAB
ALBUMIN SERPL-MCNC: 4 G/DL (ref 3.2–4.6)
ALBUMIN/GLOB SERPL: 1.3 {RATIO} (ref 1.2–2.2)
ALP SERPL-CCNC: 83 IU/L (ref 39–117)
ALT SERPL-CCNC: 18 IU/L (ref 0–44)
AST SERPL-CCNC: 19 IU/L (ref 0–40)
BILIRUB SERPL-MCNC: 0.3 MG/DL (ref 0–1.2)
BUN SERPL-MCNC: 37 MG/DL (ref 10–36)
BUN/CREAT SERPL: 22 (ref 10–24)
CALCIUM SERPL-MCNC: 9.6 MG/DL (ref 8.6–10.2)
CHLORIDE SERPL-SCNC: 104 MMOL/L (ref 96–106)
CHOLEST SERPL-MCNC: 235 MG/DL (ref 100–199)
CO2 SERPL-SCNC: 20 MMOL/L (ref 18–29)
CREAT SERPL-MCNC: 1.67 MG/DL (ref 0.76–1.27)
ERYTHROCYTE [DISTWIDTH] IN BLOOD BY AUTOMATED COUNT: 15 % (ref 12.3–15.4)
EST. AVERAGE GLUCOSE BLD GHB EST-MCNC: 103 MG/DL
GLOBULIN SER CALC-MCNC: 3 G/DL (ref 1.5–4.5)
GLUCOSE SERPL-MCNC: 79 MG/DL (ref 65–99)
HBA1C MFR BLD: 5.2 % (ref 4.8–5.6)
HCT VFR BLD AUTO: 42.7 % (ref 37.5–51)
HDLC SERPL-MCNC: 63 MG/DL
HGB BLD-MCNC: 13.3 G/DL (ref 12.6–17.7)
INTERPRETATION, 910389: NORMAL
LDLC SERPL CALC-MCNC: 147 MG/DL (ref 0–99)
MCH RBC QN AUTO: 26.8 PG (ref 26.6–33)
MCHC RBC AUTO-ENTMCNC: 31.1 G/DL (ref 31.5–35.7)
MCV RBC AUTO: 86 FL (ref 79–97)
PLATELET # BLD AUTO: 174 X10E3/UL (ref 150–379)
POTASSIUM SERPL-SCNC: 4.5 MMOL/L (ref 3.5–5.2)
PROT SERPL-MCNC: 7 G/DL (ref 6–8.5)
RBC # BLD AUTO: 4.96 X10E6/UL (ref 4.14–5.8)
SODIUM SERPL-SCNC: 143 MMOL/L (ref 134–144)
TRIGL SERPL-MCNC: 124 MG/DL (ref 0–149)
VLDLC SERPL CALC-MCNC: 25 MG/DL (ref 5–40)
WBC # BLD AUTO: 6.7 X10E3/UL (ref 3.4–10.8)

## 2017-06-22 NOTE — PROGRESS NOTES
Please let them know his BUN has increased a little. He should continue to work on staying hydrated.

## 2017-06-24 ENCOUNTER — TELEPHONE (OUTPATIENT)
Dept: INTERNAL MEDICINE CLINIC | Age: 82
End: 2017-06-24

## 2017-06-24 NOTE — LETTER
6/27/2017 2:15 PM 
 
Mr. Clarita Gallo [address] 
[facility] [address] [id number] Dear Clarita Gallo: Please find your most recent results below. Resulted Orders METABOLIC PANEL, COMPREHENSIVE Result Value Ref Range Glucose 79 65 - 99 mg/dL BUN 37 (H) 10 - 36 mg/dL Creatinine 1.67 (H) 0.76 - 1.27 mg/dL GFR est non-AA 35 (L) >59 mL/min/1.73 GFR est AA 40 (L) >59 mL/min/1.73  
 BUN/Creatinine ratio 22 10 - 24 Sodium 143 134 - 144 mmol/L Potassium 4.5 3.5 - 5.2 mmol/L Chloride 104 96 - 106 mmol/L  
 CO2 20 18 - 29 mmol/L Calcium 9.6 8.6 - 10.2 mg/dL Protein, total 7.0 6.0 - 8.5 g/dL Albumin 4.0 3.2 - 4.6 g/dL GLOBULIN, TOTAL 3.0 1.5 - 4.5 g/dL A-G Ratio 1.3 1.2 - 2.2 Bilirubin, total 0.3 0.0 - 1.2 mg/dL Alk. phosphatase 83 39 - 117 IU/L  
 AST (SGOT) 19 0 - 40 IU/L  
 ALT (SGPT) 18 0 - 44 IU/L  
CBC W/O DIFF Result Value Ref Range WBC 6.7 3.4 - 10.8 x10E3/uL  
 RBC 4.96 4.14 - 5.80 x10E6/uL HGB 13.3 12.6 - 17.7 g/dL HCT 42.7 37.5 - 51.0 % MCV 86 79 - 97 fL  
 MCH 26.8 26.6 - 33.0 pg  
 MCHC 31.1 (L) 31.5 - 35.7 g/dL  
 RDW 15.0 12.3 - 15.4 % PLATELET 076 805 - 465 x10E3/uL LIPID PANEL Result Value Ref Range Cholesterol, total 235 (H) 100 - 199 mg/dL Triglyceride 124 0 - 149 mg/dL HDL Cholesterol 63 >39 mg/dL VLDL, calculated 25 5 - 40 mg/dL LDL, calculated 147 (H) 0 - 99 mg/dL HEMOGLOBIN A1C Result Value Ref Range Hemoglobin A1c 5.2 4.8 - 5.6 % Estimated average glucose 103 mg/dL CVD REPORT Result Value Ref Range INTERPRETATION Note RECOMMENDATIONS: 
 
Please let them know his BUN has increased a little. He should continue to work on staying hydrated. Please call me if you have any questions: [phone] Sincerely, 
 
 
Melissa Murillo LPN

## 2017-06-24 NOTE — TELEPHONE ENCOUNTER
----- Message from Shree Gaspar MD sent at 6/22/2017  2:19 PM EDT -----  Please let them know his BUN has increased a little. He should continue to work on staying hydrated.

## 2017-06-24 NOTE — TELEPHONE ENCOUNTER
Attempted to contact pt at  number, no answer. Lvm for pt to return call to office at [phone]. Will continue to try to contact pt.

## 2017-06-27 DIAGNOSIS — I10 ESSENTIAL HYPERTENSION WITH GOAL BLOOD PRESSURE LESS THAN 140/90: ICD-10-CM

## 2017-06-27 DIAGNOSIS — R31.9 URINARY TRACT INFECTION WITH HEMATURIA, SITE UNSPECIFIED: ICD-10-CM

## 2017-06-27 DIAGNOSIS — E78.2 MIXED HYPERLIPIDEMIA: ICD-10-CM

## 2017-06-27 DIAGNOSIS — N39.0 URINARY TRACT INFECTION WITH HEMATURIA, SITE UNSPECIFIED: ICD-10-CM

## 2017-06-27 DIAGNOSIS — R73.01 IFG (IMPAIRED FASTING GLUCOSE): ICD-10-CM

## 2017-06-27 NOTE — TELEPHONE ENCOUNTER
Unsuccessful attempt to reach pt for results. Left message for him to call back at his earliest convenience. Results letter generated, this encounter to be closed.

## 2017-07-20 ENCOUNTER — TELEPHONE (OUTPATIENT)
Dept: INTERNAL MEDICINE CLINIC | Age: 82
End: 2017-07-20

## 2017-07-20 NOTE — TELEPHONE ENCOUNTER
[address] [place] [address] Records called to check on status of plan of treatment record to be signed by PCP. Document was sent to practice 7-6-17.

## 2017-12-29 ENCOUNTER — TELEPHONE (OUTPATIENT)
Dept: INTERNAL MEDICINE CLINIC | Age: 82
End: 2017-12-29

## 2017-12-29 ENCOUNTER — OFFICE VISIT (OUTPATIENT)
Dept: INTERNAL MEDICINE CLINIC | Age: 82
End: 2017-12-29

## 2017-12-29 VITALS
SYSTOLIC BLOOD PRESSURE: 100 MMHG | RESPIRATION RATE: 16 BRPM | TEMPERATURE: 97.5 F | HEIGHT: 63 IN | DIASTOLIC BLOOD PRESSURE: 64 MMHG | BODY MASS INDEX: 17.89 KG/M2 | HEART RATE: 66 BPM | WEIGHT: 101 LBS

## 2017-12-29 DIAGNOSIS — Z13.5 SCREENING FOR GLAUCOMA: ICD-10-CM

## 2017-12-29 DIAGNOSIS — I67.89 OTHER CEREBROVASCULAR DISEASE: ICD-10-CM

## 2017-12-29 DIAGNOSIS — H61.23 BILATERAL IMPACTED CERUMEN: ICD-10-CM

## 2017-12-29 DIAGNOSIS — J40 BRONCHITIS: ICD-10-CM

## 2017-12-29 DIAGNOSIS — N18.30 CKD (CHRONIC KIDNEY DISEASE) STAGE 3, GFR 30-59 ML/MIN (HCC): ICD-10-CM

## 2017-12-29 DIAGNOSIS — R53.81 DEBILITY: Primary | ICD-10-CM

## 2017-12-29 DIAGNOSIS — R73.01 IFG (IMPAIRED FASTING GLUCOSE): ICD-10-CM

## 2017-12-29 DIAGNOSIS — I10 ESSENTIAL HYPERTENSION: ICD-10-CM

## 2017-12-29 DIAGNOSIS — B35.1 ONYCHOMYCOSIS: ICD-10-CM

## 2017-12-29 DIAGNOSIS — L85.3 XEROSIS CUTIS: ICD-10-CM

## 2017-12-29 RX ORDER — AZITHROMYCIN 250 MG/1
TABLET, FILM COATED ORAL
Qty: 6 TAB | Refills: 0 | Status: SHIPPED | OUTPATIENT
Start: 2017-12-29 | End: 2018-01-03

## 2017-12-29 NOTE — PROGRESS NOTES
ROOM # 17    Franchesca Giles presents today for   Chief Complaint   Patient presents with    Hypertension    Cough       Cam Trish Espinal preferred language for health care discussion is english/other. Is someone accompanying this pt? Yes, daughter    Is the patient using any DME equipment during OV? Yes, wheelchair    Depression Screening:  PHQ over the last two weeks 6/21/2017 5/15/2017 9/27/2016 8/16/2016 12/31/2015   Little interest or pleasure in doing things Not at all Not at all Not at all Not at all Not at all   Feeling down, depressed or hopeless Not at all Not at all Not at all Not at all Not at all   Total Score PHQ 2 0 0 0 0 0       Learning Assessment:  No flowsheet data found. Abuse Screening:  Abuse Screening Questionnaire 6/21/2017   Do you ever feel afraid of your partner? N   Are you in a relationship with someone who physically or mentally threatens you? N   Is it safe for you to go home? Y       Fall Risk  Fall Risk Assessment, last 12 mths 12/29/2017 6/21/2017 5/15/2017 3/24/2017 9/27/2016 8/16/2016 7/27/2016   Able to walk? Yes Yes Yes Yes Yes Yes Yes   Fall in past 12 months? No Yes Yes Yes No No Yes   Fall with injury? - Yes Yes Yes - - Yes   Number of falls in past 12 months - 1 2 3 - - 1   Fall Risk Score - 2 3 4 - - 2       Health Maintenance reviewed and discussed per provider. Yes    Franchesca Giles is due for eye exam, glaucoma (put in referral), influenza doing today, a1c, micoralbumin pend for today, foot referred to podiatry, mwv, zoster, tdap. Please order/place referral if appropriate. Pt currently taking Antiplatelet therapy? no    Advance Directive:  1. Do you have an advance directive in place? Patient Reply: yes    2. Per patient no changes to their ACP contact [address]. Coordination of Care:  1. Have you been to the ER, urgent care clinic since your last visit? Hospitalized since your last visit? no    2.  Have you seen or consulted any other health care providers outside of the [facility] since your last visit? Include any pap smears or colon screening. no    Please see Red banners under Allergies, Med rec, Immunizations to remove outside inquires. All correct information has been verified with patient and added to chart.

## 2017-12-29 NOTE — MR AVS SNAPSHOT
Visit Information Date & Time Provider Department Dept. Phone Encounter #  
 12/29/2017  9:00 AM [address] [phone] [id number] Upcoming Health Maintenance Date Due MICROALBUMIN Q1 7/24/1932 EYE EXAM RETINAL OR DILATED Q1 7/24/1932 DTaP/Tdap/Td series (1 - Tdap) 7/24/1943 ZOSTER VACCINE AGE 60> 5/24/1982 MEDICARE YEARLY EXAM 12/31/2016 FOOT EXAM Q1 2/23/2017 Influenza Age 5 to Adult 8/1/2017 HEMOGLOBIN A1C Q6M 12/21/2017 GLAUCOMA SCREENING Q2Y 12/22/2017 LIPID PANEL Q1 6/22/2018 Allergies as of 12/29/2017  Review Complete On: 12/29/2017 By: Doreen Gamboa No Known Allergies Current Immunizations  Reviewed on 9/21/2012 Name Date Influenza High Dose Vaccine PF 9/27/2016 Influenza Vaccine (Quad) PF 11/11/2015  4:09 PM  
 Influenza Vaccine Split 9/21/2012, 9/30/2011 Pneumococcal Conjugate (PCV-13) 6/21/2017 12:37 PM  
 Pneumococcal Polysaccharide (PPSV-23) 11/11/2015  4:11 PM  
  
 Not reviewed this visit You Were Diagnosed With   
  
 Codes Comments Debility    -  Primary ICD-10-CM: R53.81 ICD-9-CM: 799.3 Xerosis cutis     ICD-10-CM: L85.3 ICD-9-CM: 706.8 Essential hypertension     ICD-10-CM: I10 
ICD-9-CM: 401.9 CKD (chronic kidney disease) stage 3, GFR 30-59 ml/min     ICD-10-CM: N18.3 ICD-9-CM: 602. 3 Bilateral impacted cerumen     ICD-10-CM: H61.23 
ICD-9-CM: 380.4 IFG (impaired fasting glucose)     ICD-10-CM: R73.01 
ICD-9-CM: 790.21 Screening for glaucoma     ICD-10-CM: Z13.5 ICD-9-CM: V80.1 Onychomycosis     ICD-10-CM: B35.1 ICD-9-CM: 110.1 Other cerebrovascular disease     ICD-10-CM: I67.89 ICD-9-CM: 437.8 Vitals BP Pulse Temp Resp Height(growth percentile) Weight(growth percentile) 100/64 (BP 1 Location: Right arm, BP Patient Position: Sitting) 66 97.5 °F (36.4 °C) (Oral) 16 5' 3\" (1.6 m) 101 lb (45.8 kg) BMI Smoking Status 17.89 kg/m2 Former Smoker Vitals History BMI and BSA Data Body Mass Index Body Surface Area  
 17.89 kg/m 2 1.43 m 2 Preferred Pharmacy Pharmacy Name Phone [facility] [address] [place] [address] Bryon Vincent [phone] Your Updated Medication List  
  
   
This list is accurate as of: 12/29/17  9:52 AM.  Always use your most recent med list.  
  
  
  
  
 CENTRUM PO Take  by mouth.  
  
 emollient combination no. 34 topical cream  
Commonly known as:  HYDROCERIN PLUS Apply  to affected area as needed. prednisoLONE acetate 1 % ophthalmic suspension Commonly known as:  PRED FORTE Administer 1 Drop to left eye two (2) times a day. Prescriptions Sent to Pharmacy Refills  
 emollient combination no.34 (HYDROCERIN PLUS) topical cream 5 Sig: Apply  to affected area as needed. Class: Normal  
 Pharmacy: [address] [place], [place] [address]roxy  Bryon Vincent Ph #: [phone] Route: Topical  
  
We Performed the Following  DIABETES FOOT EXAM [HM7 Custom] REFERRAL TO OPHTHALMOLOGY [REF57 Custom] Comments:  
 Please evaluate patient for glaucoma screening. REFERRAL TO PODIATRY [REF90 Custom] Comments:  
 Please evaluate pt for thickened nails, foot care. REMOVAL IMPACTED CERUMEN IRRIGATION/LVG UNILAT [id number] CPT(R)] To-Do List   
 12/29/2017 Lab:  HEMOGLOBIN A1C W/O EAG   
  
 12/29/2017 Lab:  LIPID PANEL   
  
 12/29/2017 Lab:  METABOLIC PANEL, COMPREHENSIVE   
  
 12/29/2017 Lab:  MICROALBUMIN, UR, RAND W/ MICROALBUMIN/CREA RATIO Referral Information Referral ID Referred By Referred To  
  
 [id number] Sunni Deng MD   
   [address] [place] [address]   
   [facility] [address] Phone: [phone] Fax: [phone] Visits Status Start Date End Date 1 New Request 12/29/17 12/29/18 If your referral has a status of pending review or denied, additional information will be sent to support the outcome of this decision. Referral ID Referred By Referred To  
 [id number] IMANIKourtney Zoran, [address] Phone: [phone] Fax: [phone] Visits Status Start Date End Date 1 New Request 12/29/17 12/29/18 If your referral has a status of pending review or denied, additional information will be sent to support the outcome of this decision. Patient Instructions Dry Skin: Care Instructions Your Care Instructions Dry skin is a common problem, especially in areas where the air is very dry. Dry skin can also become a problem as you get older and lose natural oils that keep your skin moist. 
A tendency toward dry, itchy skin may run in families. Some problems with the body's defenses (immune system), allergies, or an infection with a fungus may also cause patches of dry skin. An over-the-counter cream may help your dry skin. If your skin problem does not get better with home treatment, your doctor may prescribe ointment. You may need antibiotics if you have a skin infection. Follow-up care is a key part of your treatment and safety. Be sure to make and go to all appointments, and call your doctor if you are having problems. It's also a good idea to know your test results and keep a list of the medicines you take. How can you care for yourself at home? Showers and baths · Keep showers and baths short, and use warm or lukewarm water. Don't use hot water. It takes off more of your skin's natural oils. · Use as little soap as you can. Choose a mild soap, such as Dove, Cetaphil, or Neutrogena. Or use a skin cleanser like Aquanil or Cetaphil. · If you are taking a bath, use soap only at the very end.  Then rinse off all traces of soap with fresh water. Gently pat your skin dry with a towel. Skin creams and moisturizers · Apply moisturizer or skin cream right away (within 3 minutes) after a bath or shower. Use a moisturizer at other times too, as often as you need it. · Moisturizing creams are better than lotions. Try brands like CeraVe cream, Cetaphil cream, or Eucerin cream. 
Other tips · When washing clothes, use a small amount of detergent. Don't use fabric softeners or dryer sheets. · For small areas of itchy skin, try an over-the-counter 1% hydrocortisone cream. 
· If you have very dry hands, spread petroleum jelly (such as Vaseline) on your hands before bed. Wear thin cotton gloves while you sleep. If your feet are dry, spread Vaseline on them and wear socks while you sleep. When should you call for help? Call your doctor now or seek immediate medical care if: 
? · You have signs of infection, such as: 
¨ Pain, warmth, or swelling in the skin. ¨ Red streaks near a wound in the skin. ¨ Pus coming from a wound in your skin. ¨ A fever. ? Watch closely for changes in your health, and be sure to contact your doctor if: 
? · You do not get better as expected. Where can you learn more? Go to http://laureen-bart.info/. Enter B246 in the search box to learn more about \"Dry Skin: Care Instructions. \" Current as of: October 13, 2016 Content Version: 11.4 © 0781-2563 [employer]. Care instructions adapted under license by [employer] (which disclaims liability or warranty for this information). If you have questions about a medical condition or this instruction, always ask your healthcare professional. [place] [address] any warranty or liability for your use of this information. Earwax Blockage: Care Instructions Your Care Instructions Earwax is a natural substance that protects the ear canal. Normally, earwax drains from the ears and does not cause problems. Sometimes earwax builds up and hardens. Earwax blockage (also called cerumen impaction) can cause some loss of hearing and pain. When wax is tightly packed, you will need to have your doctor remove it. Follow-up care is a key part of your treatment and safety. Be sure to make and go to all appointments, and call your doctor if you are having problems. It's also a good idea to know your test results and keep a list of the medicines you take. How can you care for yourself at home? · Do not try to remove earwax with cotton swabs, fingers, or other objects. This can make the blockage worse and damage the eardrum. · If your doctor recommends that you try to remove earwax at home: ¨ Soften and loosen the earwax with warm mineral oil. You also can try hydrogen peroxide mixed with an equal amount of room temperature water. Place 2 drops of the fluid, warmed to body temperature, in the ear two times a day for up to 5 days. ¨ Once the wax is loose and soft, all that is usually needed to remove it from the ear canal is a gentle, warm shower. Direct the water into the ear, then tip your head to let the earwax drain out. Dry your ear thoroughly with a hair dryer set on low. Hold the dryer several inches from your ear. ¨ If the warm mineral oil and shower do not work, use an over-the-counter wax softener followed by gentle flushing with an ear syringe each night for a week or two. Make sure the flushing solution is body temperature. Cool or hot fluids in the ear can cause dizziness. When should you call for help? Call your doctor now or seek immediate medical care if: 
? · Pus or blood drains from your ear. ? · Your ears are ringing or feel full. ? · You have a loss of hearing. ? Watch closely for changes in your health, and be sure to contact your doctor if: 
? · You have pain or reduced hearing after 1 week of home treatment. ? · You have any new symptoms, such as nausea or balance problems. Where can you learn more? Go to http://laureen-bart.info/. Enter H907 in the search box to learn more about \"Earwax Blockage: Care Instructions. \" Current as of: March 20, 2017 Content Version: 11.4 © 6856-6461 [employer]STREET [employer]. Care instructions adapted under license by [employer] (which disclaims liability or warranty for this information). If you have questions about a medical condition or this instruction, always ask your healthcare professional. [address] any warranty or liability for your use of this information. Introducing [facility]! [address] introduces Boracci patient portal. Now you can access parts of your medical record, email your doctor's office, and request medication refills online. 1. In your internet browser, go to [email or web address] 2. Click on the First Time User? Click Here link in the Sign In box. You will see the New Member Sign Up page. 3. Enter your Boracci Access Code exactly as it appears below. You will not need to use this code after youve completed the sign-up process. If you do not sign up before the expiration date, you must request a new code. · Boracci Access Code: [email or web address] Expires: 3/29/2018  9:52 AM 
 
4. Enter the last four digits of your Social Security Number (xxxx) and Date of Birth (mm/dd/yyyy) as indicated and click Submit. You will be taken to the next sign-up page. 5. Create a StackSafet ID. This will be your Boracci login ID and cannot be changed, so think of one that is secure and easy to remember. 6. Create a Boracci password. You can change your password at any time. 7. Enter your Password Reset Question and Answer. This can be used at a later time if you forget your password. 8. Enter your e-mail address.  You will receive e-mail notification when new information is available in Zentact. 9. Click Sign Up. You can now view and download portions of your medical record. 10. Click the Download Summary menu link to download a portable copy of your medical information. If you have questions, please visit the Frequently Asked Questions section of the Zentact website. Remember, Zentact is NOT to be used for urgent needs. For medical emergencies, dial 911. Now available from your iPhone and Android! Please provide this summary of care documentation to your next provider. Your primary care clinician is listed as Ander Ferrell. If you have any questions after today's visit, please call [phone].

## 2017-12-29 NOTE — PATIENT INSTRUCTIONS
Dry Skin: Care Instructions  Your Care Instructions  Dry skin is a common problem, especially in areas where the air is very dry. Dry skin can also become a problem as you get older and lose natural oils that keep your skin moist.  A tendency toward dry, itchy skin may run in families. Some problems with the body's defenses (immune system), allergies, or an infection with a fungus may also cause patches of dry skin. An over-the-counter cream may help your dry skin. If your skin problem does not get better with home treatment, your doctor may prescribe ointment. You may need antibiotics if you have a skin infection. Follow-up care is a key part of your treatment and safety. Be sure to make and go to all appointments, and call your doctor if you are having problems. It's also a good idea to know your test results and keep a list of the medicines you take. How can you care for yourself at home? Showers and baths  · Keep showers and baths short, and use warm or lukewarm water. Don't use hot water. It takes off more of your skin's natural oils. · Use as little soap as you can. Choose a mild soap, such as Dove, Cetaphil, or Neutrogena. Or use a skin cleanser like Aquanil or Cetaphil. · If you are taking a bath, use soap only at the very end. Then rinse off all traces of soap with fresh water. Gently pat your skin dry with a towel. Skin creams and moisturizers  · Apply moisturizer or skin cream right away (within 3 minutes) after a bath or shower. Use a moisturizer at other times too, as often as you need it. · Moisturizing creams are better than lotions. Try brands like CeraVe cream, Cetaphil cream, or Eucerin cream.  Other tips  · When washing clothes, use a small amount of detergent. Don't use fabric softeners or dryer sheets. · For small areas of itchy skin, try an over-the-counter 1% hydrocortisone cream.  · If you have very dry hands, spread petroleum jelly (such as Vaseline) on your hands before bed. Wear thin cotton gloves while you sleep. If your feet are dry, spread Vaseline on them and wear socks while you sleep. When should you call for help? Call your doctor now or seek immediate medical care if:  ? · You have signs of infection, such as:  ¨ Pain, warmth, or swelling in the skin. ¨ Red streaks near a wound in the skin. ¨ Pus coming from a wound in your skin. ¨ A fever. ? Watch closely for changes in your health, and be sure to contact your doctor if:  ? · You do not get better as expected. Where can you learn more? Go to http://laureen-bart.info/. Enter L180 in the search box to learn more about \"Dry Skin: Care Instructions. \"  Current as of: October 13, 2016  Content Version: 11.4  © 0972-9891 [employer]BR. Care instructions adapted under license by [employer] (which disclaims liability or warranty for this information). If you have questions about a medical condition or this instruction, always ask your healthcare professional. [place] [address] any warranty or liability for your use of this information. Earwax Blockage: Care Instructions  Your Care Instructions    Earwax is a natural substance that protects the ear canal. Normally, earwax drains from the ears and does not cause problems. Sometimes earwax builds up and hardens. Earwax blockage (also called cerumen impaction) can cause some loss of hearing and pain. When wax is tightly packed, you will need to have your doctor remove it. Follow-up care is a key part of your treatment and safety. Be sure to make and go to all appointments, and call your doctor if you are having problems. It's also a good idea to know your test results and keep a list of the medicines you take. How can you care for yourself at home? · Do not try to remove earwax with cotton swabs, fingers, or other objects. This can make the blockage worse and damage the eardrum.   · If your doctor recommends that you try to remove earwax at home:  ¨ Soften and loosen the earwax with warm mineral oil. You also can try hydrogen peroxide mixed with an equal amount of room temperature water. Place 2 drops of the fluid, warmed to body temperature, in the ear two times a day for up to 5 days. ¨ Once the wax is loose and soft, all that is usually needed to remove it from the ear canal is a gentle, warm shower. Direct the water into the ear, then tip your head to let the earwax drain out. Dry your ear thoroughly with a hair dryer set on low. Hold the dryer several inches from your ear. ¨ If the warm mineral oil and shower do not work, use an over-the-counter wax softener followed by gentle flushing with an ear syringe each night for a week or two. Make sure the flushing solution is body temperature. Cool or hot fluids in the ear can cause dizziness. When should you call for help? Call your doctor now or seek immediate medical care if:  ? · Pus or blood drains from your ear. ? · Your ears are ringing or feel full. ? · You have a loss of hearing. ? Watch closely for changes in your health, and be sure to contact your doctor if:  ? · You have pain or reduced hearing after 1 week of home treatment. ? · You have any new symptoms, such as nausea or balance problems. Where can you learn more? Go to http://laureen-bart.info/. Enter W080 in the search box to learn more about \"Earwax Blockage: Care Instructions. \"  Current as of: March 20, 2017  Content Version: 11.4  © 0402-6923 [employer]. Care instructions adapted under license by [employer] (which disclaims liability or warranty for this information). If you have questions about a medical condition or this instruction, always ask your healthcare professional. [address] any warranty or liability for your use of this information.

## 2017-12-29 NOTE — TELEPHONE ENCOUNTER
Incoming call from [address] [place] [address] asking for directions on pt Hydrocerin cream, how often he should be suing it daily and where he will be applying it.     Please advise

## 2017-12-29 NOTE — PROGRESS NOTES
HISTORY OF PRESENT ILLNESS  Rolando De Leon is a 80 y.o. male. HPI Comments: 79 yo male here for f/u of debility, HTN, IFG. He has not been taking any of his PO medications. BP stable off meds. He has not had further falls. Uses wc for distances, walker in home. Has had cough for past 2-3 weeks. Not significantly productive. Some trouble with hearing at times. Daughter notes this varies based on voice, telephone use. Reports some toe pain R great toe. Review of Systems   Constitutional: Negative for chills, fever and weight loss. HENT: Positive for hearing loss. Negative for congestion and ear pain. Eyes: Negative for blurred vision and pain. Respiratory: Positive for cough. Negative for shortness of breath and wheezing. Cardiovascular: Negative for chest pain, palpitations and leg swelling. Gastrointestinal: Negative for nausea and vomiting. Musculoskeletal: Negative for falls and myalgias. Skin: Positive for itching (dry skin). Negative for rash. Neurological: Negative for dizziness, tingling and headaches. Psychiatric/Behavioral: Negative for depression. The patient is not nervous/anxious. Past Medical History:   Diagnosis Date    Arthritis     BPH (benign prostatic hyperplasia)     Gout     Hypercholesterolemia     Hypertension      Current Outpatient Prescriptions on File Prior to Visit   Medication Sig Dispense Refill    metoprolol tartrate (LOPRESSOR) 25 mg tablet Take 1 Tab by mouth two (2) times a day. 283 Tab 3    FOLIC ACID/MV,FE,MIN (CENTRUM PO) Take  by mouth.  prednisoLONE acetate (PRED FORTE) 1 % ophthalmic suspension Administer 1 Drop to left eye two (2) times a day.  tamsulosin (FLOMAX) 0.4 mg capsule 0.4 mg.      magnesium oxide (MAG-OX) 400 mg tablet Take 1 Tab by mouth daily.  60 Tab 2    haloperidol (HALDOL) 2 mg/mL oral concentrate 0.5  ml bid  Indications: DELIRIUM 120 mL 5     No current facility-administered medications on file prior to visit. Social History   Substance Use Topics    Smoking status: Former Smoker    Smokeless tobacco: Never Used    Alcohol use No     Physical Exam   Constitutional: He appears well-developed and well-nourished. No distress. /64 (BP 1 Location: Right arm, BP Patient Position: Sitting)  Pulse 66  Temp 97.5 °F (36.4 °C) (Oral)   Resp 16  Ht 5' 3\" (1.6 m)  Wt 101 lb (45.8 kg)  BMI 17.89 kg/m2     HENT:   + cerumen impaction B   Eyes: EOM are normal. Right eye exhibits no discharge. Left eye exhibits no discharge. No scleral icterus. Neck: Neck supple. Cardiovascular: Normal rate, regular rhythm and normal heart sounds. Exam reveals no gallop and no friction rub. No murmur heard. Pulmonary/Chest: Effort normal and breath sounds normal. No respiratory distress. He has no wheezes. He has no rales. Musculoskeletal: He exhibits no edema or tenderness. Lymphadenopathy:     He has no cervical adenopathy. Neurological: He is alert. He exhibits normal muscle tone. Skin: Skin is warm and dry. + xerosis   Psychiatric: He has a normal mood and affect. Diabetic Foot exam: 2+ dorsalis pedis pulses bilaterally. Positive monofilament in all 10 toes and bottoms of both feet. Negative for lesions, signs of infection, or foot abnormalities other than thickened overgrown nails. Lab Results   Component Value Date/Time    Sodium 143 06/21/2017 12:52 PM    Potassium 4.5 06/21/2017 12:52 PM    Chloride 104 06/21/2017 12:52 PM    CO2 20 06/21/2017 12:52 PM    Anion gap 11 07/27/2016 05:26 PM    Glucose 79 06/21/2017 12:52 PM    BUN 37 06/21/2017 12:52 PM    Creatinine 1.67 06/21/2017 12:52 PM    BUN/Creatinine ratio 22 06/21/2017 12:52 PM    GFR est AA 40 06/21/2017 12:52 PM    GFR est non-AA 35 06/21/2017 12:52 PM    Calcium 9.6 06/21/2017 12:52 PM    Bilirubin, total 0.3 06/21/2017 12:52 PM    AST (SGOT) 19 06/21/2017 12:52 PM    Alk.  phosphatase 83 06/21/2017 12:52 PM    Protein, total 7.0 06/21/2017 12:52 PM    Albumin 4.0 06/21/2017 12:52 PM    Globulin 2.7 11/09/2015 04:16 AM    A-G Ratio 1.3 06/21/2017 12:52 PM    ALT (SGPT) 18 06/21/2017 12:52 PM     Lab Results   Component Value Date/Time    Hemoglobin A1c 5.2 06/21/2017 12:52 PM    Hemoglobin A1c (POC) 5.3 07/07/2015 05:22 PM       ASSESSMENT and PLAN    ICD-10-CM ICD-9-CM    1. Debility R53.81 799.3    2. Xerosis cutis L85.3 706.8    3. Essential hypertension I10 401.9    4. CKD (chronic kidney disease) stage 3, GFR 30-59 ml/min L18.2 165.6 METABOLIC PANEL, COMPREHENSIVE   5. Bronchitis J40 490    6. Bilateral impacted cerumen H61.23 380.4 REMOVAL IMPACTED CERUMEN IRRIGATION/LVG UNILAT   7. IFG (impaired fasting glucose) R73.01 790.21 REFERRAL TO OPHTHALMOLOGY      REFERRAL TO PODIATRY       DIABETES FOOT EXAM      MICROALBUMIN, UR, RAND W/ MICROALBUMIN/CREA RATIO      HEMOGLOBIN A1C W/O EAG      LIPID PANEL   8. Screening for glaucoma Z13.5 V80.1 REFERRAL TO OPHTHALMOLOGY   9. Onychomycosis B35.1 110.1 REFERRAL TO PODIATRY   10. Other cerebrovascular disease  I67.89 437.8 LIPID PANEL     Functionally stable. Will repeat labs today. Cerumen removed with loop, irrigation. Will treat with course of azithromycin given length of sx, rhonchi. Return for flu shot when feeling better. Referrals entered for ophthalmology, podiatry.

## 2017-12-29 NOTE — ACP (ADVANCE CARE PLANNING)
Advance Directive:  1. Do you have an advance directive in place? Patient Reply: yes    2.   Per patient no changes to their ACP contact [place] Ras

## 2017-12-30 LAB
ALBUMIN SERPL-MCNC: 3.6 G/DL (ref 3.2–4.6)
ALBUMIN/CREAT UR: 48.5 MG/G CREAT (ref 0–30)
ALBUMIN/GLOB SERPL: 1 {RATIO} (ref 1.2–2.2)
ALP SERPL-CCNC: 91 IU/L (ref 39–117)
ALT SERPL-CCNC: 12 IU/L (ref 0–44)
AST SERPL-CCNC: 19 IU/L (ref 0–40)
BILIRUB SERPL-MCNC: 0.3 MG/DL (ref 0–1.2)
BUN SERPL-MCNC: 32 MG/DL (ref 10–36)
BUN/CREAT SERPL: 21 (ref 10–24)
CALCIUM SERPL-MCNC: 9.7 MG/DL (ref 8.6–10.2)
CHLORIDE SERPL-SCNC: 101 MMOL/L (ref 96–106)
CHOLEST SERPL-MCNC: 229 MG/DL (ref 100–199)
CO2 SERPL-SCNC: 28 MMOL/L (ref 18–29)
CREAT SERPL-MCNC: 1.55 MG/DL (ref 0.76–1.27)
CREAT UR-MCNC: 115.5 MG/DL
GLOBULIN SER CALC-MCNC: 3.7 G/DL (ref 1.5–4.5)
GLUCOSE SERPL-MCNC: 113 MG/DL (ref 65–99)
HBA1C MFR BLD: 5.3 % (ref 4.8–5.6)
HDLC SERPL-MCNC: 63 MG/DL
INTERPRETATION, 910389: NORMAL
LDLC SERPL CALC-MCNC: 148 MG/DL (ref 0–99)
MICROALBUMIN UR-MCNC: 56 UG/ML
POTASSIUM SERPL-SCNC: 5.2 MMOL/L (ref 3.5–5.2)
PROT SERPL-MCNC: 7.3 G/DL (ref 6–8.5)
SODIUM SERPL-SCNC: 143 MMOL/L (ref 134–144)
TRIGL SERPL-MCNC: 90 MG/DL (ref 0–149)
VLDLC SERPL CALC-MCNC: 18 MG/DL (ref 5–40)

## 2018-01-03 ENCOUNTER — TELEPHONE (OUTPATIENT)
Dept: INTERNAL MEDICINE CLINIC | Age: 83
End: 2018-01-03

## 2018-01-03 RX ORDER — PETROLATUM 42 G/100G
OINTMENT TOPICAL
Qty: 452 G | Refills: 5 | Status: SHIPPED | OUTPATIENT
Start: 2018-01-03

## 2018-01-03 NOTE — TELEPHONE ENCOUNTER
[address] called in stating they received a Rx for Hydrocerin, but states that medication has been discontinued. Can you please send a new Rx for a new medication.